# Patient Record
Sex: FEMALE | Race: WHITE | NOT HISPANIC OR LATINO | Employment: FULL TIME | URBAN - METROPOLITAN AREA
[De-identification: names, ages, dates, MRNs, and addresses within clinical notes are randomized per-mention and may not be internally consistent; named-entity substitution may affect disease eponyms.]

---

## 2023-09-19 ENCOUNTER — TELEPHONE (OUTPATIENT)
Dept: PSYCHIATRY | Facility: CLINIC | Age: 33
End: 2023-09-19

## 2023-09-19 NOTE — TELEPHONE ENCOUNTER
Behavioral Health Outpatient Intake Questions    Referred By: referral from [facility] counseling agency     Please advise interviewee that they need to answer all questions truthfully to allow for best care, and any misrepresentations of information may affect their ability to be seen at this clinic   => Was this discussed? Yes     If Minor Child (under age 25)    Who is/are the legal guardian(s) of the child? Is there a custody agreement? No     • If "YES"- Custody orders must be obtained prior to scheduling the first appointment  • In addition, Consent to Treatment must be signed by all legal guardians prior to scheduling the first appointment    • If "NO"- Consent to Treatment must be signed by all legal guardians prior to scheduling the first appointment      [address] History -     Presenting Problem (in patient's own words): mandated by Court- anxiety  Diagnosed with adjustment disorder    Are there any communication barriers for this patient? No                                               If yes, please describe barriers: ADHD  • If there is a unique situation, please refer to [address] for final determination. Are you taking any psychiatric medications? Yes   •   If "YES" -What are they Adderall   •   If "YES" -Who prescribes? [address]    Has the Patient previously received outpatient Talk Therapy or Medication Management from [facility]  No     •    If "YES"- When, Where and with Whom? n/a     •    If "NO" -Has Patient received these services elsewhere? •   If "YES" -When, Where, and with Whom? [facility] - May 2023-August 2023- had to leave due to conflict of interest (nothing serious)    Has the Patient abused alcohol or other substances in the last 6 months ? No  No concerns of substance abuse are reported.     •  If "YES" -What substance, How much, How often? n/a    •  If illegal substance: Refer to [facility] (for TOMY) or [facility]. •  If Alcohol in excess of 10 drinks per week:  Refer to [facility] (for TOMY) or [facility] History-     Is this treatment court ordered? Yes - awaiting court order  If "yes "send to :  • Talk Therapy : Send to [address] [place] [address] for final determination   • Med Management: Send to Dr Saleem Ng for final determination     Has the Patient been convicted of a felony? No   If "Yes" send to -When, What? n/a  • Talk Therapy : Send to [address] [place] [address] for final determination   • Med Management: Send to Dr Saleem Ng for final determination     ACCEPTED as a patient No  • If "Yes" Appointment Date: n/a    Referred Elsewhere? Yes  • If “Yes” - (Where? Ex: [facility], [address] [place] [address], etc.) Awaiting clinical approval for scheduling therapy (individual)      Name of Insurance [address] [place] [address]#[id number]  Insurance Phone #n/a  If ins is primary or secondary?primary  If patient is a minor, parents information such as Name, D. O.B of guarantor.

## 2023-09-19 NOTE — TELEPHONE ENCOUNTER
Approval rec'd and client has been scheduled with Michael Huang on 9/28 at 2pm in person. Court document and approval email uploaded into .

## 2023-09-28 ENCOUNTER — OFFICE VISIT (OUTPATIENT)
Dept: BEHAVIORAL/MENTAL HEALTH CLINIC | Facility: CLINIC | Age: 33
End: 2023-09-28
Payer: COMMERCIAL

## 2023-09-28 DIAGNOSIS — F43.21 ADJUSTMENT DISORDER WITH DEPRESSED MOOD: ICD-10-CM

## 2023-09-28 DIAGNOSIS — F41.1 GENERALIZED ANXIETY DISORDER: Primary | ICD-10-CM

## 2023-09-28 PROCEDURE — 90837 PSYTX W PT 60 MINUTES: CPT
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
## 2023-10-12 ENCOUNTER — SOCIAL WORK (OUTPATIENT)
Dept: BEHAVIORAL/MENTAL HEALTH CLINIC | Facility: CLINIC | Age: 33
End: 2023-10-12
Payer: COMMERCIAL

## 2023-10-12 DIAGNOSIS — F43.21 ADJUSTMENT DISORDER WITH DEPRESSED MOOD: ICD-10-CM

## 2023-10-12 DIAGNOSIS — F90.2 ATTENTION DEFICIT HYPERACTIVITY DISORDER, COMBINED TYPE: ICD-10-CM

## 2023-10-12 DIAGNOSIS — F41.1 GENERALIZED ANXIETY DISORDER: Primary | ICD-10-CM

## 2023-10-12 PROCEDURE — 90837 PSYTX W PT 60 MINUTES: CPT

## 2023-10-12 NOTE — BH CRISIS PLAN
Client Name: Lincoln Murdock       Client YOB: 1990  : 1990    Treatment Team (include name and contact information):     Psychotherapist: Michael Huang    Psychiatrist:    Release of information completed: no    "    Release of information completed: no    Other (Specify Role):     Release of information completed: no    Other (Specify Role):    Release of information completed: no    Healthcare Provider  Joshua Deras PA-C  [address] [facility] [address]      Type of Plan   * Child plans (children 15 yo and younger) must be completed and signed by the child's legal guardian   * Plans for all individuals 15 yo and above must be signed by the client.      Plan Type: adolescent/adult (14 and over) Initial      My Personal Strengths are (in the client's own words):  "***"  The stressors and triggers that may put me at risk are:  {AMB PSYCH/BH Triggers/Stressors:99884}    Coping skills I can use to keep myself calm and safe:  {AMB PSYCH/BH COPING SKILLS:15506}    Coping skills/supports I can use to maintain abstinence from substance use:   {Substance Copin}    The people that provide me with help and support: (Include name, contact, and how they can help)   Support person #1: ***    * Phone number: {Support Person Phone:[phone]}    * How can they help me? ***   Support person #2:***    * Phone number: {Support Person Phone:89667}    * How can they help me? ***     Support person #3: ***    * Phone number: {Support Person Phone:46156}    * How can they help me? ***    In the past, the following has helped me in times of crisis:    {AMB PSYCH/BH Things that help:92762}      If it is an emergency and you need immediate help, call     If there is a possibility of danger to yourself or others, call the following crisis hotline resources:     Adult Crisis Numbers  Suicide Prevention Hotline - Dial   [facility]: [address] [facility] [address] [facility] [address]: [address]: [address] [facility] [address]: [phone]  [address] [place] [address] Street: [phone]  [facility]: [address]: [address]: [phone] (daytime). [phone] (after hours, weekends, holidays)     Child/Adolescent Crisis Numbers   [facility] AND [facility]S [facility]: [address]: [phone]   Gwenevere Meyer: [phone]   [address] [place] [address] Street: [phone]    Please note: Some [facility] do not have a separate number for Child/Adolescent specific crisis. If your county is not listed under Child/Adolescent, please call the adult number for your county     National Talk to Text Line   All Mxsl - [phone]    In the event your feelings become unmanageable, and you cannot reach your support system, you will call 911 immediately or go to the nearest hospital emergency room.
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
## 2023-10-12 NOTE — BH TREATMENT PLAN
Outpatient Behavioral Health Psychotherapy Treatment Plan    Regina Carlson  1990     Date of Initial Psychotherapy Assessment: 9/28/23   Date of Current Treatment Plan: 10/12/23  Treatment Plan Target Date: 4/08/24  Treatment Plan Expiration Date: 4/8/24    Diagnosis:   No diagnosis found. Area(s) of Need: sense of self; anxiety    Long Term Goal 1 (in the client's own words): I want to identify goals for myself as a person    Stage of Change: Action    Target Date for completion: 4/8/24     Anticipated therapeutic modalities: talk therapy, MI, CBT, mindfulness      People identified to complete this goal: Kennis Blizzard, therapist      Objective 1: (identify the means of measuring success in meeting the objective):   Kennis Blizzard will particiapte in sessions to identify goals for self and methods to achieve the changes in herself that she would like to see happen      Objective 2: (identify the means of measuring success in meeting the objective):   Rigobertoblas Basiliaadela will practice the skills needed to facilitate the changes that she would like to see in herself in session and then in her everyday life      Long Term Goal 2 (in the client's own words):  I would like to address my anxiety regarding court issues and custody    Stage of Change: Action    Target Date for completion: 4/8/24     Anticipated therapeutic modalities: talk therapy, CBT, mindfulness     People identified to complete this goal: Kennis Blizzard, therapist      Objective 1: (identify the means of measuring success in meeting the objective):   Rigobertoblas Blizzard will participate in sessions using various modalities to identify triggers to anxiety in addition to the current custody situation      Objective 2: (identify the means of measuring success in meeting the objective):   Rigobertoblas Loomissusy will identify 2-3 coping strategies to use when anxious and will practice those strategies in session to increase her ability to independently implement them effectively outside of therapy Long Term Goal 3 (in the client's own words):     Stage of Change:     Target Date for completion:      Anticipated therapeutic modalities:      People identified to complete this goal:       Objective 1: (identify the means of measuring success in meeting the objective):       Objective 2: (identify the means of measuring success in meeting the objective): I am currently under the care of a [facility] psychiatric provider: no    My [facility] psychiatric provider is: N/A    I am currently taking psychiatric medications: Yes, as prescribed    I feel that I will be ready for discharge from mental health care when I reach the following (measurable goal/objective): when I am able to manage anxiety and when I have defined who I am as a person and how to achieve those goals    For children and adults who have a legal guardian:   Has there been any change to custody orders and/or guardianship status? NA. If yes, attach updated documentation. I have created my Crisis Plan and have been offered a copy of this plan. Crisis plan will be developed at the next session    [facility]: Diagnosis and Treatment Plan explained to [address] [place] [address] acknowledges an understanding of their diagnosis. Emily Melgoza agrees to this treatment plan.     I have been offered a copy of this Treatment Plan. yes

## 2023-10-26 ENCOUNTER — SOCIAL WORK (OUTPATIENT)
Dept: BEHAVIORAL/MENTAL HEALTH CLINIC | Facility: CLINIC | Age: 33
End: 2023-10-26
Payer: COMMERCIAL

## 2023-10-26 DIAGNOSIS — F41.1 GENERALIZED ANXIETY DISORDER: Primary | ICD-10-CM

## 2023-10-26 DIAGNOSIS — F43.21 ADJUSTMENT DISORDER WITH DEPRESSED MOOD: ICD-10-CM

## 2023-10-26 PROCEDURE — 90837 PSYTX W PT 60 MINUTES: CPT

## 2023-10-26 NOTE — BH CRISIS PLAN
Client Name: Anastacio Ayala       Client YOB: 1990  : 1990    Treatment Team (include name and contact information):     Psychotherapist: Jimbo Bejarano    Psychiatrist:    Release of information completed: no    "    Release of information completed: Other (Specify Role):     Release of information completed: no    Other (Specify Role):    Release of information completed: no    Healthcare Provider  Torey Pandey PA-C  [address] [place] [address]      Type of Plan   * Child plans (children 15 yo and younger) must be completed and signed by the child's legal guardian   * Plans for all individuals 15 yo and above must be signed by the client. Plan Type: adolescent/adult (15 and over) Initial      My Personal Strengths are (in the client's own words):  Organized, detail oriented, productive in daily tasks, strong minded, loves being near water, paddle boarding, easy to talk to    The stressors and triggers that may put me at risk are:  Grandfathers illness, alcohol use problems by mother, anniversary of mothers death, child custody issues, family problems, legal problems, and social difficulties(less friends currently)    Coping skills I can use to keep myself calm and safe: Take a shower, Listen to music, Physical activity, Severance/meditate, Journal, and Other (describe) reading, dog    Coping skills/supports I can use to maintain abstinence from substance use:   N/A    The people that provide me with help and support: (Include name, contact, and how they can help)   Support person #1: Grandfather    * Phone number: in cell phone    * How can they help me? Gives me a sense of comfort     Support person #2:    * Phone number:     * How can they help me? Support person #3:     * Phone number:       * How can they help me?      In the past, the following has helped me in times of crisis:    Being in a quiet space, Being with other people, Taking a walk or exercising, Breathing exercises (or other mindfulness-based activities), Praying or meditating, Listening to music, and Watching television or a movie      If it is an emergency and you need immediate help, call     If there is a possibility of danger to yourself or others, call the following crisis hotline resources:     Adult Crisis Numbers  Suicide Prevention Hotline - Dial   [facility]: [address] [facility]: [address]: [address] [facility] [address]: [phone]  [facility]: [phone]  [facility]: [address]: [address]: [phone] (daytime). [phone] (after hours, weekends, holidays)     Child/Adolescent Crisis Numbers   [facility]: [address]: [phone]   [facility]: [phone]   [facility]: [phone]    Please note: Some [facility] do not have a separate number for Child/Adolescent specific crisis. If your county is not listed under Child/Adolescent, please call the adult number for your county     National Talk to Text Line   All Ages - [phone]    In the event your feelings become unmanageable, and you cannot reach your support system, you will call 911 immediately or go to the nearest hospital emergency room.
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
## 2023-10-31 ENCOUNTER — TELEPHONE (OUTPATIENT)
Dept: PSYCHIATRY | Facility: CLINIC | Age: 33
End: 2023-10-31

## 2023-10-31 NOTE — TELEPHONE ENCOUNTER
Spoke with pt in regards to temporary transfer of care from current therapist who will be out on medical leave. Pt has been schedule with Kathy Doreen for 11/10 and 11/28.

## 2023-11-28 ENCOUNTER — SOCIAL WORK (OUTPATIENT)
Dept: BEHAVIORAL/MENTAL HEALTH CLINIC | Facility: CLINIC | Age: 33
End: 2023-11-28
Payer: COMMERCIAL

## 2023-11-28 DIAGNOSIS — F41.1 GENERALIZED ANXIETY DISORDER: Primary | ICD-10-CM

## 2023-11-28 PROCEDURE — 90834 PSYTX W PT 45 MINUTES: CPT

## 2023-12-03 NOTE — PSYCH
Behavioral Health Psychotherapy Progress Note    Psychotherapy Provided: Individual Psychotherapy     1. Generalized anxiety disorder            Goals addressed in session: Goal 1     DATA: Session began with a mental health check in and exchanging introductions. The clinician identified that she would be providing therapy for the month of December while her ongoing therapist was on leave. The client provided a brief personal history and the current challenges with coparenting with her ex-. The client discussed learning boundaries and practicing set boundaries with her son's father. During this session, this clinician used the following therapeutic modalities: Engagement Strategies, Client-centered Therapy, Cognitive Behavioral Therapy, Motivational Interviewing, and Supportive Psychotherapy    Substance Abuse was not addressed during this session. If the client is diagnosed with a co-occurring substance use disorder, please indicate any changes in the frequency or amount of use: None. Stage of change for addressing substance use diagnoses: No substance use/Not applicable    ASSESSMENT:  Carson Lacy presents with a Anxious and Depressed mood. her affect is Normal range and intensity and Tearful, which is congruent, with her mood and the content of the session. The client has made progress on their goals. Carson Lacy presents with a minimal risk of suicide, minimal risk of self-harm, and minimal risk of harm to others. For any risk assessment that surpasses a "low" rating, a safety plan must be developed. A safety plan was indicated: no  If yes, describe in detail N/A    PLAN: Carson Lacy will reflect on the content of session, practice self care, and positive self talk.  At the next session, the therapist will use Engagement Strategies, Client-centered Therapy, Cognitive Behavioral Therapy, Motivational Interviewing, and Supportive Psychotherapy to address emotional regulation, positive self talk, identified coping strategies, and boundary setting. Behavioral Health Treatment Plan and Discharge Planning: Bharat Valerogonzález is aware of and agrees to continue to work on their treatment plan. They have identified and are working toward their discharge goals.  no    This note was not shared with the patient due to this is a psychotherapy note    Visit start and stop times:    12/03/23  Start Time: 1000  Stop Time: 1045  Total Visit Time: 45 minutes

## 2023-12-12 ENCOUNTER — SOCIAL WORK (OUTPATIENT)
Dept: BEHAVIORAL/MENTAL HEALTH CLINIC | Facility: CLINIC | Age: 33
End: 2023-12-12
Payer: COMMERCIAL

## 2023-12-12 DIAGNOSIS — F41.1 GENERALIZED ANXIETY DISORDER: Primary | ICD-10-CM

## 2023-12-12 PROCEDURE — 90832 PSYTX W PT 30 MINUTES: CPT

## 2023-12-18 NOTE — PSYCH
"Behavioral Health Psychotherapy Progress Note    Psychotherapy Provided: Individual Psychotherapy     1. Generalized anxiety disorder            Goals addressed in session: Goal 1     DATA: Session began with a mental health check in with the client. This session will be the last session with this therapist before returning to her regular therapist Dennis. The client discussed in the session the challenges she was experiencing with her eldest son becoming more distant from her. The client discussed the emotions surrounding her children's father and his controlling nature. The client shared that the most recent custody order stated that she and the children's father had 50/50 custody, client would continue with therapy, client would provide attendance record in therapy to children's father, and refrain from introducing new partners to children. The client discussed that no future custody hearing dates have been set. The clinician advised the client to reach out to her  regarding custody arrangements.     During this session, this clinician used the following therapeutic modalities: Engagement Strategies, Client-centered Therapy, Cognitive Behavioral Therapy, Motivational Interviewing, and Supportive Psychotherapy    Substance Abuse was not addressed during this session. If the client is diagnosed with a co-occurring substance use disorder, please indicate any changes in the frequency or amount of use: None. Stage of change for addressing substance use diagnoses: No substance use/Not applicable    ASSESSMENT:  Maria Esther Moore presents with a Anxious mood.     her affect is Normal range and intensity, which is congruent, with her mood and the content of the session. The client has made progress on their goals.     Maria Esther Moore presents with a minimal risk of suicide, minimal risk of self-harm, and minimal risk of harm to others.    For any risk assessment that surpasses a \"low\" rating, a safety plan must " be developed.    A safety plan was indicated: no  If yes, describe in detail N/A    PLAN: Between sessions, Maria Esther Moore will reflect on the content of session, consider outreach to , and practice self care. At the next session, the therapist will use Engagement Strategies, Client-centered Therapy, Cognitive Behavioral Therapy, Motivational Interviewing, and Supportive Psychotherapy to address emotional dysregulation, anxiety, boundary setting, and self care.    Behavioral Health Treatment Plan and Discharge Planning: Maria Esther Moore is aware of and agrees to continue to work on their treatment plan. They have identified and are working toward their discharge goals. Yes    This note was not shared with the patient due to this is a psychotherapy note    Visit start and stop times:    12/18/23  Start Time: 1107  Stop Time: 1148  Total Visit Time: 41 minutes

## 2024-01-04 ENCOUNTER — SOCIAL WORK (OUTPATIENT)
Dept: BEHAVIORAL/MENTAL HEALTH CLINIC | Facility: CLINIC | Age: 34
End: 2024-01-04
Payer: COMMERCIAL

## 2024-01-04 DIAGNOSIS — F41.1 GENERALIZED ANXIETY DISORDER: Primary | ICD-10-CM

## 2024-01-04 DIAGNOSIS — F43.21 ADJUSTMENT DISORDER WITH DEPRESSED MOOD: ICD-10-CM

## 2024-01-04 PROCEDURE — 90837 PSYTX W PT 60 MINUTES: CPT

## 2024-01-04 NOTE — PSYCH
"Behavioral Health Psychotherapy Progress Note    Psychotherapy Provided: Individual Psychotherapy     No diagnosis found.    Goals addressed in session: Goal 1 and Goal 2     DATA:  Met with client individually.  Client shared that her GF passed, big loss.  Childrens father got engaged, 5 yo son said he was \"getting a new mommy and brothers and sisters\" and that she felt hurt by that statement.  Son said later that he didn't want a new mommy.  13 yo son spending more time at mothers house.  Client concerned about attention with fiance's children moving in with them.  Goal of increasing friends and support system.  Client is hosting weekly family dinners for sons, brother, uncles etc.  \"Meet up\" for increasing social group.   During this session, this clinician used the following therapeutic modalities: Client centered therapy, CBT, mindfuness and supportive therapy    Substance Abuse was not addressed during this session. If the client is diagnosed with a co-occurring substance use disorder, please indicate any changes in the frequency or amount of use: . Stage of change for addressing substance use diagnoses: No substance use/Not applicable    ASSESSMENT:  Maria Esther Moore presents with a Euthymic/ normal mood.     her affect is Normal range and intensity, which is congruent, with her mood and the content of the session. The client has made progress on their goals.  Maria Esther is better able to verbalize her feelings and set goals for herself     Maria Esther Moore presents with a minimal risk of suicide, minimal risk of self-harm, and minimal risk of harm to others.    For any risk assessment that surpasses a \"low\" rating, a safety plan must be developed.    A safety plan was indicated: no  If yes, describe in detail crisis plan on file    PLAN: Between sessions, Maria Esther Moore will continue to use coping skills when anxious and will explore ways to increase her social group. At the next session, the therapist " will use Client-centered Therapy, Cognitive Behavioral Therapy, Mindfulness-based Strategies, and Supportive Psychotherapy to address anxiety and self definition.    Behavioral Health Treatment Plan and Discharge Planning: Maria Esther Moore is aware of and agrees to continue to work on their treatment plan. They have identified and are working toward their discharge goals. yes    Visit start and stop times:    01/04/24

## 2024-01-04 NOTE — BH CRISIS PLAN
"Client Name: Maria Esther Moore       Client YOB: 1990  : 1990    Treatment Team (include name and contact information):     Psychotherapist: Dennis Cross     Psychiatrist:    Release of information completed:     \"    Release of information completed:     Other (Specify Role):     Release of information completed:     Other (Specify Role):    Release of information completed:     Healthcare Provider  Charmaine De La Cruz PA-C  [address] [place] [id number]      Type of Plan   * Child plans (children 12 yo and younger) must be completed and signed by the child's legal guardian   * Plans for all individuals 13 yo and above must be signed by the client.     Plan Type: adolescent/adult (14 and over) Initial      My Personal Strengths are (in the client's own words):    The stressors and triggers that may put me at risk are:  {AMB PSYCH/BH Triggers/Stressors:51808}    Coping skills I can use to keep myself calm and safe:  {AMB PSYCH/BH COPING SKILLS:22293}    Coping skills/supports I can use to maintain abstinence from substance use:   {Substance Copin}    The people that provide me with help and support: (Include name, contact, and how they can help)   Support person #1: ***    * Phone number: {Support Person Phone:12655}    * How can they help me? ***   Support person #2:***    * Phone number: {Support Person Phone:60303}    * How can they help me? ***     Support person #3: ***    * Phone number: {Support Person Phone:05694}    * How can they help me? ***    In the past, the following has helped me in times of crisis:    {AMB PSYCH/BH Things that help:19792}      If it is an emergency and you need immediate help, call     If there is a possibility of danger to yourself or others, call the following crisis hotline resources:     Adult Crisis Numbers  Suicide Prevention Hotline - Dial   [facility]: [phone]  [facility]: [phone]  [facility]: " [phone]  [facility]: [phone]  [facility]: [phone]  [facility]: [phone]  [facility]: [phone]  [facility]: [phone] (daytime).       [phone] (after hours, weekends, holidays)     Child/Adolescent Crisis Numbers   [facility]: [phone]   [facility]: [phone]   [place]: [phone]   [facility]: [phone]    Please note: Some [facility] do not have a separate number for Child/Adolescent specific crisis. If your county is not listed under Child/Adolescent, please call the adult number for your county     National Talk to Text Line   All Ages - [phone]    In the event your feelings become unmanageable, and you cannot reach your support system, you will call 911 immediately or go to the nearest hospital emergency room.

## 2024-01-18 ENCOUNTER — SOCIAL WORK (OUTPATIENT)
Dept: BEHAVIORAL/MENTAL HEALTH CLINIC | Facility: CLINIC | Age: 34
End: 2024-01-18
Payer: COMMERCIAL

## 2024-01-18 DIAGNOSIS — F90.2 ATTENTION DEFICIT HYPERACTIVITY DISORDER, COMBINED TYPE: ICD-10-CM

## 2024-01-18 DIAGNOSIS — F41.1 GENERALIZED ANXIETY DISORDER: Primary | ICD-10-CM

## 2024-01-18 DIAGNOSIS — F43.21 ADJUSTMENT DISORDER WITH DEPRESSED MOOD: ICD-10-CM

## 2024-01-18 PROCEDURE — 90837 PSYTX W PT 60 MINUTES: CPT

## 2024-01-18 NOTE — PSYCH
"Behavioral Health Psychotherapy Progress Note    Psychotherapy Provided: Individual Psychotherapy     No diagnosis found.    Goals addressed in session: Goal 1 and Goal 2     DATA:   Met with client individually.  Discussed recent events with sons and sons father and inconsistency between households.  Client is setting appropriate boundaries with older son but father does not, son is rebelling toward client.  Discussed possibility of co-parent counseling through the court if necessary as father is putting client in the \"bad \" role and client is concerned that son will get into things that are harmful, given her family's history of D&A issues.  Client is attempting to increase her social [place] by interacting with other parents at sporting events etc.  Discussed possibility of \"Meet up\" groups for single parents etc.  Client has begun to have family dinners at her home weekly which include her sons, uncles and her brother and his wife.  Client has begun interactions more with her brother which appears to be a support for her.    During this session, this clinician used the following therapeutic modalities: Client-centered Therapy, Cognitive Behavioral Therapy, and Supportive Psychotherapy    Substance Abuse was not addressed during this session. If the client is diagnosed with a co-occurring substance use disorder, please indicate any changes in the frequency or amount of use: . Stage of change for addressing substance use diagnoses: No substance use/Not applicable    ASSESSMENT:  Maria Esther Moore presents with a Euthymic/ normal mood.     her affect is Normal range and intensity, which is congruent, with her mood and the content of the session. The client has made progress on their goals.     Maria Esther Moore presents with a minimal risk of suicide, minimal risk of self-harm, and minimal risk of harm to others.    For any risk assessment that surpasses a \"low\" rating, a safety plan must be developed.    A " safety plan was indicated: no  If yes, describe in detail crisis plan on file    PLAN: Between sessions, Maria Esther Moore will continue to set and maintain appropriate expectations for her children and will attmept to communicate with sons father.  Client will continue to expand her social/support [place] to increase her sense of self and self image. At the next session, the therapist will use Client-centered Therapy, Cognitive Behavioral Therapy, and Supportive Psychotherapy to address anxiety and increasing her sense of self.    Behavioral Health Treatment Plan and Discharge Planning: Maria Esther Moore is aware of and agrees to continue to work on their treatment plan. They have identified and are working toward their discharge goals. yes    Visit start and stop times:    01/18/24  Start Time: 1400  Stop Time: 1457  Total Visit Time: 57 minutes

## 2024-02-01 ENCOUNTER — SOCIAL WORK (OUTPATIENT)
Dept: BEHAVIORAL/MENTAL HEALTH CLINIC | Facility: CLINIC | Age: 34
End: 2024-02-01
Payer: COMMERCIAL

## 2024-02-01 DIAGNOSIS — F43.21 ADJUSTMENT DISORDER WITH DEPRESSED MOOD: ICD-10-CM

## 2024-02-01 DIAGNOSIS — F41.1 GENERALIZED ANXIETY DISORDER: Primary | ICD-10-CM

## 2024-02-01 DIAGNOSIS — F90.2 ATTENTION DEFICIT HYPERACTIVITY DISORDER, COMBINED TYPE: ICD-10-CM

## 2024-02-01 PROCEDURE — 90837 PSYTX W PT 60 MINUTES: CPT

## 2024-02-05 NOTE — PSYCH
"Behavioral Health Psychotherapy Progress Note    Psychotherapy Provided: Individual Psychotherapy     No diagnosis found.    Goals addressed in session: Goal 1 and Goal 2     DATA: Met with client individually.  Discussed clients relationship with father of her 2 children and current custody situation.  Client stated feeling \"positive about it for the first time since this started\".  Client stated that her older son is more engaged with her and that the conflict with their father is less, mostly due to his lack of engagement.  Father got engaged after dating a woman for 6 months and sons told her that her kids are calling Anthony \"Dad\".   Discussed ways to talk to Anthony about it to see if that is even a thought.  Also processed why her children who do not have a relationship with their fathers would want to call Anthony \"dad\" but her children have a relationship with their mom, so they may not want to.  She stated being concerned that her sons will be pressured to call the fiance \"mom\".  Client discussed continued efforts to connect with extended family and other adults in an effort to define herself now.    During this session, this clinician used the following therapeutic modalities: Client-centered Therapy, Cognitive Behavioral Therapy, and Supportive Psychotherapy    Substance Abuse was not addressed during this session. If the client is diagnosed with a co-occurring substance use disorder, please indicate any changes in the frequency or amount of use: . Stage of change for addressing substance use diagnoses: No substance use/Not applicable    ASSESSMENT:  Maria Esther Moore presents with a Euthymic/ normal mood.     her affect is Normal range and intensity, which is congruent, with her mood and the content of the session. The client has made progress on their goals.     Maria Esther Moore presents with a minimal risk of suicide, minimal risk of self-harm, and minimal risk of harm to others.    For any risk assessment " "that surpasses a \"low\" rating, a safety plan must be developed.    A safety plan was indicated: no  If yes, describe in detail crisis plan on file    PLAN: Between sessions, Maria Esther Moore will continue to attempt to coparent with Anthony but will set boundaries for her home when boys are there.  She will continue to outreach to extended family and other adults to define herself more clearly. At the next session, the therapist will use Client-centered Therapy, Cognitive Behavioral Therapy, Mindfulness-based Strategies, and Supportive Psychotherapy to address anxiety.    Behavioral Health Treatment Plan and Discharge Planning: Maria Esther Moore is aware of and agrees to continue to work on their treatment plan. They have identified and are working toward their discharge goals. yes    Visit start and stop times:    02/05/24  Start Time: 1400  Stop Time: 1455  Total Visit Time: 55 minutes  "

## 2024-02-29 ENCOUNTER — SOCIAL WORK (OUTPATIENT)
Dept: BEHAVIORAL/MENTAL HEALTH CLINIC | Facility: CLINIC | Age: 34
End: 2024-02-29
Payer: COMMERCIAL

## 2024-02-29 DIAGNOSIS — F43.21 ADJUSTMENT DISORDER WITH DEPRESSED MOOD: ICD-10-CM

## 2024-02-29 DIAGNOSIS — F41.1 GENERALIZED ANXIETY DISORDER: Primary | ICD-10-CM

## 2024-02-29 DIAGNOSIS — F90.2 ATTENTION DEFICIT HYPERACTIVITY DISORDER, COMBINED TYPE: ICD-10-CM

## 2024-02-29 PROCEDURE — 90837 PSYTX W PT 60 MINUTES: CPT

## 2024-02-29 NOTE — PSYCH
"Behavioral Health Psychotherapy Progress Note    Psychotherapy Provided: Individual Psychotherapy     No diagnosis found.    Goals addressed in session: Goal 1 and Goal 2   Data:   Met with client individually.  Client discussed feeling that Nestor is not wanting to be with her or if he is just \"being an adolescent\".  Discussed typical developmental stage of individuation and what Nestor might be feeling regarding all of the changes in his family life in the past year. Client reported that Anthony is taking Nestor out of therapy and expressed concern because she feels he could benefit from it.  Client stated that she did talk to her  about it as it is court ordered but doesn't want to \"make waves\" and \"appear to be the crazy one\".   Discussed different parenting styles between her and Anthony and what she can and cannot control and setting appropriate parental limits for herself.  Client reported attending several AA meetings over the past week.  She stated not feeling that she has a \"drinking problem\" but admits that she does get episodes of depression after drinking.  She reports that she is also meeting sober friends with similar kids issues which is a positive thing for her.   During this session, this clinician used the following therapeutic modalities: Client-centered Therapy, Cognitive Behavioral Therapy, Supportive Psychotherapy, and psychoeducation    Substance Abuse was not addressed during this session. If the client is diagnosed with a co-occurring substance use disorder, please indicate any changes in the frequency or amount of use: client drinks occasionally but sees a pattern of depression after drinking. Stage of change for addressing substance use diagnoses: Pre-contemplation    ASSESSMENT:  Maria Esther Moore presents with a Euthymic/ normal mood.     her affect is Normal range and intensity, which is congruent, with her mood and the content of the session. The client has made progress on their " "goals.     Maria Esther Moore presents with a minimal risk of suicide, minimal risk of self-harm, and minimal risk of harm to others.    For any risk assessment that surpasses a \"low\" rating, a safety plan must be developed.    A safety plan was indicated: no  If yes, describe in detail crisis plan on file    PLAN: Between sessions, Maria Esther Moore will continue to set appropriate parental boundaries in her home and will attempt to talk to Nestor about his feelings of being split between households (hers, father and step mother parents). At the next session, the therapist will use Client-centered Therapy, Cognitive Behavioral Therapy, Mindfulness-based Strategies, and Supportive Psychotherapy to address anxiety and self definition.    Behavioral Health Treatment Plan and Discharge Planning: Maria Esther Moore is aware of and agrees to continue to work on their treatment plan. They have identified and are working toward their discharge goals. yes    Visit start and stop times:    02/29/24  Start Time: 1400  Stop Time: 1454  Total Visit Time: 54 minutes  " Quality 130: Documentation Of Current Medications In The Medical Record: Current Medications Documented Quality 431: Preventive Care And Screening: Unhealthy Alcohol Use - Screening: Patient not identified as an unhealthy alcohol user when screened for unhealthy alcohol use using a systematic screening method Quality 110: Preventive Care And Screening: Influenza Immunization: Influenza Immunization Administered during Influenza season Detail Level: Detailed Quality 226: Preventive Care And Screening: Tobacco Use: Screening And Cessation Intervention: Patient screened for tobacco use and is an ex/non-smoker

## 2024-03-14 ENCOUNTER — TELEMEDICINE (OUTPATIENT)
Dept: BEHAVIORAL/MENTAL HEALTH CLINIC | Facility: CLINIC | Age: 34
End: 2024-03-14
Payer: COMMERCIAL

## 2024-03-14 DIAGNOSIS — F43.21 ADJUSTMENT DISORDER WITH DEPRESSED MOOD: ICD-10-CM

## 2024-03-14 DIAGNOSIS — F90.2 ATTENTION DEFICIT HYPERACTIVITY DISORDER, COMBINED TYPE: ICD-10-CM

## 2024-03-14 DIAGNOSIS — F41.1 GENERALIZED ANXIETY DISORDER: Primary | ICD-10-CM

## 2024-03-14 PROCEDURE — 90834 PSYTX W PT 45 MINUTES: CPT

## 2024-03-15 NOTE — PSYCH
"Behavioral Health Psychotherapy Progress Note    Psychotherapy Provided: Individual Psychotherapy     No diagnosis found.    Goals addressed in session: Goal 1 and Goal 2     DATA:   Met with client via Epic Embedded.  Client discussed improvement in communication with childrens father in that he has been responding to her text messages over the past 2 weeks and was open to negotiating time being missed with her boys while he takes them to [place] for a week and for increased contact with them while in [place].  Client also reported that older son Nestor has been voluntarily spending more time with her.  He has been coming to the house more and asking to do activities with her and his brother more frequently.  Client stated feeling like there \"is a light at the end of this tunnel finally\".  Client has continued to attend AA meetings and to connect with other women who are also dealing with similar situations.  She made a decision for her peace of mind to change the family dinner night to Sunday to lessen the stress on her and the change was well received by family members.   During this session, this clinician used the following therapeutic modalities: Client-centered Therapy and Supportive Psychotherapy    Substance Abuse was not addressed during this session. If the client is diagnosed with a co-occurring substance use disorder, please indicate any changes in the frequency or amount of use: . Stage of change for addressing substance use diagnoses: No substance use/Not applicable    ASSESSMENT:  Maria Esther Moore presents with a Euthymic/ normal mood.     her affect is Normal range and intensity, which is congruent, with her mood and the content of the session. The client has made progress on their goals.     Maria Esther Moore presents with a minimal risk of suicide, minimal risk of self-harm, and minimal risk of harm to others.    For any risk assessment that surpasses a \"low\" rating, a safety plan must be developed.    A " safety plan was indicated: no  If yes, describe in detail crisis plan on file    PLAN: Between sessions, Maria Esther Moore will continue to set and maintain boudnaries with sons and sons' father. At the next session, the therapist will use Client-centered Therapy and Supportive Psychotherapy to address anxiety and finding her self.    Behavioral Health Treatment Plan and Discharge Planning: Maria Esther Moore is aware of and agrees to continue to work on their treatment plan. They have identified and are working toward their discharge goals. yes    Visit start and stop times:    03/15/24  Start Time: 1400  Stop Time: 1448  Total Visit Time: 48 minutes  Verification of patient location:    Patient is located at Home in the following state in which I provide services: [place]        Encounter provider Dennis Cross    Provider located at [facility]  [address] [address]  [phone]        The patient was identified by name and date of birth. Maria Esther Moore was informed that this is a telemedicine visit and that the visit is being conducted throughthe Epic Embedded platform. She agrees to proceed..  My office door was closed. No one else was in the room.  She acknowledged consent and understanding of privacy and security of the video platform. The patient has agreed to participate and understands they can discontinue the visit at any time.    Patient is aware this is a billable service.

## 2024-03-26 ENCOUNTER — TELEPHONE (OUTPATIENT)
Dept: PSYCHIATRY | Facility: CLINIC | Age: 34
End: 2024-03-26

## 2024-03-26 NOTE — TELEPHONE ENCOUNTER
Left voicemail informing patient and/or parent/guardian of the Psych Encounter form needing to be signed as a requirement from the insurance company for billing purposes. Patient can access form via [employer] and sign electronically.     Please make patient aware this form must be signed for each visit as a requirement to continue future visits with provider.

## 2024-04-01 ENCOUNTER — TELEPHONE (OUTPATIENT)
Dept: PSYCHIATRY | Facility: CLINIC | Age: 34
End: 2024-04-01

## 2024-04-11 ENCOUNTER — SOCIAL WORK (OUTPATIENT)
Dept: BEHAVIORAL/MENTAL HEALTH CLINIC | Facility: CLINIC | Age: 34
End: 2024-04-11
Payer: COMMERCIAL

## 2024-04-11 DIAGNOSIS — F43.21 ADJUSTMENT DISORDER WITH DEPRESSED MOOD: ICD-10-CM

## 2024-04-11 DIAGNOSIS — F41.1 GENERALIZED ANXIETY DISORDER: Primary | ICD-10-CM

## 2024-04-11 DIAGNOSIS — F90.2 ATTENTION DEFICIT HYPERACTIVITY DISORDER, COMBINED TYPE: ICD-10-CM

## 2024-04-11 PROCEDURE — 90837 PSYTX W PT 60 MINUTES: CPT

## 2024-04-11 NOTE — PSYCH
Behavioral Health Psychotherapy Progress Note    Psychotherapy Provided: Individual Psychotherapy     No diagnosis found.    Goals addressed in session: Goal 1 and Goal 2     DATA:   Met with client individually.  Client discussed issues with boys father and her concerns about his level of supervision of the boys, particularly the oldest who is 12. Client referenced finding out that he was planning on allowing Nestor to stay home alone for the weekend while he took Lobito hunting as well as having Nestor have access to a pool during the summer without adult supervision.  Discussed options of trying to communicate with Anthony or going to court.  Client stated that she doesn't want to put the boys in the middle but feels that is happening anyway due to Anthony making comments to the boys.  Discussed possible reasons for Anthony's refusal to have face to face communication might be that he thinks she is going to go to court for child support and she stated that she is not at this point.  Discussed ways to communicate that to Anthony to possibly get his barriers down.  Court ordered co-parenting therapy is another possibility that client would welcome but she does not feel that Anthony would be open to. Discussed the need for communication with boys so she isnt made to be the bad sarthak.  Discussed safety issues with Nestor being alone and at a pool could mean [employer] involvement.    During this session, this clinician used the following therapeutic modalities: Client-centered Therapy, Cognitive Behavioral Therapy, and Supportive Psychotherapy    Substance Abuse was not addressed during this session. If the client is diagnosed with a co-occurring substance use disorder, please indicate any changes in the frequency or amount of use: . Stage of change for addressing substance use diagnoses: No substance use/Not applicable    ASSESSMENT:  Maria Esther Moore presents with a Euthymic/ normal mood.     her affect is Normal range and intensity,  "which is congruent, with her mood and the content of the session. The client has made progress on their goals.     Maria Esther Moore presents with a minimal risk of suicide, minimal risk of self-harm, and minimal risk of harm to others.    For any risk assessment that surpasses a \"low\" rating, a safety plan must be developed.    A safety plan was indicated: no  If yes, describe in detail crisis plan on file    PLAN: Between sessions, Maria Esther Moore will attempt to increase communication with Anthony and set boundaries with him regarding concerns that she feels are safety issues for her children. At the next session, the therapist will use Client-centered Therapy, Cognitive Behavioral Therapy, and Supportive Psychotherapy to address anxiety.    Behavioral Health Treatment Plan and Discharge Planning: Maria Esther Moore is aware of and agrees to continue to work on their treatment plan. They have identified and are working toward their discharge goals. yes    Visit start and stop times:    04/11/24  Start Time: 1400  Stop Time: 1455  Total Visit Time: 55 minutes  "

## 2024-04-25 ENCOUNTER — SOCIAL WORK (OUTPATIENT)
Dept: BEHAVIORAL/MENTAL HEALTH CLINIC | Facility: CLINIC | Age: 34
End: 2024-04-25
Payer: COMMERCIAL

## 2024-04-25 DIAGNOSIS — F43.21 ADJUSTMENT DISORDER WITH DEPRESSED MOOD: ICD-10-CM

## 2024-04-25 DIAGNOSIS — F41.1 GENERALIZED ANXIETY DISORDER: Primary | ICD-10-CM

## 2024-04-25 DIAGNOSIS — F90.2 ATTENTION DEFICIT HYPERACTIVITY DISORDER, COMBINED TYPE: ICD-10-CM

## 2024-04-25 PROCEDURE — 90837 PSYTX W PT 60 MINUTES: CPT

## 2024-04-25 NOTE — BH TREATMENT PLAN
Outpatient Behavioral Health Psychotherapy Treatment Plan    Maria Esther Moore  1990     Date of Initial Psychotherapy Assessment: 9/28/23   Date of Current Treatment Plan: 04/25/24  Treatment Plan Target Date: 10/20/24  Treatment Plan Expiration Date: 10/20/24    Diagnosis:   No diagnosis found.    Area(s) of Need: sense of self; anxiety     Long Term Goal 1 (in the client's own words): I want to identify goals for myself as a person     Stage of Change: Action     Target Date for completion: 10/20/24             Anticipated therapeutic modalities: talk therapy, MI, CBT, mindfulness              People identified to complete this goal: daryl Mayo                    Objective 1: (identify the means of measuring success in meeting the objective):   Maria Esther will particiapte in sessions to identify goals for self and methods to achieve the changes in herself that she would like to see happen                    Objective 2: (identify the means of measuring success in meeting the objective):   Maria Esther will practice the skills needed to facilitate the changes that she would like to see in herself in session and then in her everyday life      Long Term Goal 2 (in the client's own words): I would like to address my anxiety regarding court issues and custody     Stage of Change: Action     Target Date for completion: 10/20/24             Anticipated therapeutic modalities: talk therapy, CBT, mindfulness             People identified to complete this goal: daryl Mayo                    Objective 1: (identify the means of measuring success in meeting the objective):   Maria Esther will participate in sessions using various modalities to identify triggers to anxiety in addition to the current custody situation                    Objective 2: (identify the means of measuring success in meeting the objective):   Maria Esther will identify 2-3 coping strategies to use when anxious and will practice those strategies in  session to increase her ability to independently implement them effectively outside of therapy       I am currently under the care of a [facility] psychiatric provider: no    My [facility] psychiatric provider is:     I am currently taking psychiatric medications:  N/A    I feel that I will be ready for discharge from mental health care when I reach the following (measurable goal/objective): I am able to use coping skills when anxious and when I feel that I have redefined myself.    For children and adults who have a legal guardian:   Has there been any change to custody orders and/or guardianship status? NA. If yes, attach updated documentation.    I have created my Crisis Plan and have been offered a copy of this plan    Behavioral Health Treatment Plan [facility]: Diagnosis and Treatment Plan explained to Maria Esther Moore acknowledges an understanding of their diagnosis. Maria Esther Moore agrees to this treatment plan.    I have been offered a copy of this Treatment Plan. yes

## 2024-04-25 NOTE — PSYCH
"Behavioral Health Psychotherapy Progress Note    Psychotherapy Provided: Individual Psychotherapy     No diagnosis found.    Goals addressed in session: Goal 1 and Goal 2     DATA:   Met with client individually.  Discussed concerns about Anthony's parenting and feeling like only options is court because he refuses to communicate with client.  Client expressed concerns that Nicole friends may be a bad influence.  She has concerns about them stealing.  Client expressed concerns regarding the level of freedom that Anthony gives Nestor at age 12 and potential danger. Nestor is seeking her out more and more.  Client reported that she is happy but concerned about the lack of parenting by Anthony-leaving kids with Sera parent/brother or alone for periods of time.  Client stated that she has worked a lot on herself and that she feels worthy of getting more custody.  Client stated that she made a priority of spending time with a friend recently to work on her goal of self identification and increasing friendships.  Felt \"broken beyond broken\" a year ago, now feeling stronger.  Helio is out of intermediate-told boys if they see him, not to be afraid or worried about upsetting her but to tell her so she can call the police-no relationship again with him ever. TP updated  During this session, this clinician used the following therapeutic modalities: Client-centered Therapy, Cognitive Behavioral Therapy, and Supportive Psychotherapy    Substance Abuse was not addressed during this session. If the client is diagnosed with a co-occurring substance use disorder, please indicate any changes in the frequency or amount of use: . Stage of change for addressing substance use diagnoses: No substance use/Not applicable    ASSESSMENT:  Maria Esther Moore presents with a Euthymic/ normal mood.     her affect is Normal range and intensity, which is congruent, with her mood and the content of the session. The client has made progress on their goals.     " "Maria Esther Moore presents with a minimal risk of suicide, minimal risk of self-harm, and minimal risk of harm to others.    For any risk assessment that surpasses a \"low\" rating, a safety plan must be developed.    A safety plan was indicated: no  If yes, describe in detail crisis plan on file    PLAN: Between sessions, Maria Esther Moore will continue to work on self improvement and will consider options that she feels are needed to secure childrens safety. At the next session, the therapist will use Client-centered Therapy and Supportive Psychotherapy to address anxiety.    Behavioral Health Treatment Plan and Discharge Planning: Maria Esther Moore is aware of and agrees to continue to work on their treatment plan. They have identified and are working toward their discharge goals. yes    Visit start and stop times:    04/25/24  1402  8815  53 min     "

## 2024-05-23 ENCOUNTER — SOCIAL WORK (OUTPATIENT)
Dept: BEHAVIORAL/MENTAL HEALTH CLINIC | Facility: CLINIC | Age: 34
End: 2024-05-23
Payer: COMMERCIAL

## 2024-05-23 DIAGNOSIS — F43.21 ADJUSTMENT DISORDER WITH DEPRESSED MOOD: ICD-10-CM

## 2024-05-23 DIAGNOSIS — F90.2 ATTENTION DEFICIT HYPERACTIVITY DISORDER, COMBINED TYPE: ICD-10-CM

## 2024-05-23 DIAGNOSIS — F41.1 GENERALIZED ANXIETY DISORDER: Primary | ICD-10-CM

## 2024-05-23 PROCEDURE — 90834 PSYTX W PT 45 MINUTES: CPT
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
## 2024-06-06 ENCOUNTER — SOCIAL WORK (OUTPATIENT)
Dept: BEHAVIORAL/MENTAL HEALTH CLINIC | Facility: CLINIC | Age: 34
End: 2024-06-06
Payer: COMMERCIAL

## 2024-06-06 DIAGNOSIS — F43.21 ADJUSTMENT DISORDER WITH DEPRESSED MOOD: ICD-10-CM

## 2024-06-06 DIAGNOSIS — F41.1 GENERALIZED ANXIETY DISORDER: Primary | ICD-10-CM

## 2024-06-06 DIAGNOSIS — F90.2 ATTENTION DEFICIT HYPERACTIVITY DISORDER, COMBINED TYPE: ICD-10-CM

## 2024-06-06 PROCEDURE — 90837 PSYTX W PT 60 MINUTES: CPT
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
## 2024-06-20 ENCOUNTER — TELEMEDICINE (OUTPATIENT)
Dept: BEHAVIORAL/MENTAL HEALTH CLINIC | Facility: CLINIC | Age: 34
End: 2024-06-20
Payer: COMMERCIAL

## 2024-06-20 DIAGNOSIS — F41.1 GENERALIZED ANXIETY DISORDER: Primary | ICD-10-CM

## 2024-06-20 DIAGNOSIS — F90.2 ATTENTION DEFICIT HYPERACTIVITY DISORDER, COMBINED TYPE: ICD-10-CM

## 2024-06-20 DIAGNOSIS — F43.21 ADJUSTMENT DISORDER WITH DEPRESSED MOOD: ICD-10-CM

## 2024-06-20 PROCEDURE — 90837 PSYTX W PT 60 MINUTES: CPT

## 2024-06-20 NOTE — PSYCH
"Behavioral Health Psychotherapy Progress Note    Psychotherapy Provided: Individual Psychotherapy     No diagnosis found.    Goals addressed in session: Goal 1     DATA:     Met with client individually via epic embedded.  Client discussed custody issues and accusations by [place].  Open questions were used to explore clients beliefs as to why son may have made such an accusation to his therapist as son admitted doing so.  Could this be coming from [place]?  Therapist validated client attempting to set appropriate parental limits and noted that Nestor still wants to be with her which seems to indicate that this was not coming from him.  Client expressed concern that kids in the neighborhood thought they saw client with Helio (it was not him) and told Nestor.  Nestor then refused to answer phone and turned off his location.  Discussed ways for client to have discussion about her understanding his trust issues but that is not ok to turn off location and not respond to calls and if it happens again=consequences. Court on 6/27.    During this session, this clinician used the following therapeutic modalities: Client-centered Therapy and Supportive Psychotherapy    Substance Abuse was not addressed during this session. If the client is diagnosed with a co-occurring substance use disorder, please indicate any changes in the frequency or amount of use: . Stage of change for addressing substance use diagnoses: No substance use/Not applicable    ASSESSMENT:  Maria Esther Moore presents with a Euthymic/ normal mood.     her affect is Normal range and intensity, which is congruent, with her mood and the content of the session. The client has made progress on their goals.     Maria Esther Moore presents with a minimal risk of suicide, minimal risk of self-harm, and minimal risk of harm to others.    For any risk assessment that surpasses a \"low\" rating, a safety plan must be developed.    A safety plan was indicated: no  If yes, describe " in detail crisis plan on file    PLAN: Between sessions, Maria Esther Moore will continue to set and maintain her parental boundaries and expectations as she feels appropriate and will continue to have open communication with sons about what is going on with custody. At the next session, the therapist will use Client-centered Therapy, Cognitive Behavioral Therapy, and Supportive Psychotherapy to address anxiety and coparenting challenges.    Behavioral Health Treatment Plan and Discharge Planning: Maria Esther Moore is aware of and agrees to continue to work on their treatment plan. They have identified and are working toward their discharge goals. yes      Virtual Regular Visit    Verification of patient location:    Patient is located at Home in the following state in which I provide services: [place]           Encounter provider Dennis Cross        The patient was identified by name and date of birth. Maria Esther Moore was informed that this is a telemedicine visit and that the visit is being conducted throughthe Epic Embedded platform. She agrees to proceed..  My office door was closed. No one else was in the room.  She acknowledged consent and understanding of privacy and security of the video platform. The patient has agreed to participate and understands they can discontinue the visit at any time.    Patient is aware this is a billable service.       Visit Time    Visit Start Time: 1401  Visit Stop Time: 1456  Total Visit Duration:  55 minutes

## 2024-07-18 ENCOUNTER — TELEMEDICINE (OUTPATIENT)
Dept: BEHAVIORAL/MENTAL HEALTH CLINIC | Facility: CLINIC | Age: 34
End: 2024-07-18
Payer: COMMERCIAL

## 2024-07-18 DIAGNOSIS — F41.1 GENERALIZED ANXIETY DISORDER: Primary | ICD-10-CM

## 2024-07-18 DIAGNOSIS — F90.2 ATTENTION DEFICIT HYPERACTIVITY DISORDER, COMBINED TYPE: ICD-10-CM

## 2024-07-18 DIAGNOSIS — F43.21 ADJUSTMENT DISORDER WITH DEPRESSED MOOD: ICD-10-CM

## 2024-07-18 PROCEDURE — 90837 PSYTX W PT 60 MINUTES: CPT
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
## 2024-07-23 ENCOUNTER — TELEPHONE (OUTPATIENT)
Dept: PSYCHIATRY | Facility: CLINIC | Age: 34
End: 2024-07-23

## 2024-08-15 ENCOUNTER — TELEMEDICINE (OUTPATIENT)
Dept: BEHAVIORAL/MENTAL HEALTH CLINIC | Facility: CLINIC | Age: 34
End: 2024-08-15
Payer: COMMERCIAL

## 2024-08-15 DIAGNOSIS — F90.2 ATTENTION DEFICIT HYPERACTIVITY DISORDER, COMBINED TYPE: ICD-10-CM

## 2024-08-15 DIAGNOSIS — F41.1 GENERALIZED ANXIETY DISORDER: Primary | ICD-10-CM

## 2024-08-15 DIAGNOSIS — F43.21 ADJUSTMENT DISORDER WITH DEPRESSED MOOD: ICD-10-CM

## 2024-08-15 PROCEDURE — 90837 PSYTX W PT 60 MINUTES: CPT

## 2024-08-20 NOTE — PSYCH
"Behavioral Health Psychotherapy Progress Note    Psychotherapy Provided: Individual Psychotherapy     No diagnosis found.    Goals addressed in session: Goal 1 and Goal 2     DATA:   court date pushed back until after start of school.  Client expressed frustration at the court process and boys father \"playing dirty\" by talking negatively about her to the boys and sending in a statement that makes her look unstable and an unfit mother. CBT used to explore the reality of his statements and of the evidence that she has accumulated showing that he is making poor choices for the boys.  Client stated feeling \"brought down\" by the whole last year and a half, therapist validated clients strength in continuing to be an involved mother and fighting for what she feels is best for her children.  Explored possible outcomes of changing [employer] school if she gets 50/50 custody after the school year starts. Client stated being proud of the relationships that she had been able to have with her GF and brother over the years.   During this session, this clinician used the following therapeutic modalities: Client-centered Therapy, Cognitive Behavioral Therapy, and Supportive Psychotherapy    Substance Abuse was not addressed during this session. If the client is diagnosed with a co-occurring substance use disorder, please indicate any changes in the frequency or amount of use: . Stage of change for addressing substance use diagnoses: No substance use/Not applicable    ASSESSMENT:  Maria Esther Moore presents with a Anxious mood.     her affect is Normal range and intensity, which is congruent, with her mood and the content of the session. The client has made progress on their goals.     Maria Esther Moore presents with a minimal risk of suicide, minimal risk of self-harm, and minimal risk of harm to others.    For any risk assessment that surpasses a \"low\" rating, a safety plan must be developed.    A safety plan was indicated: no  If " yes, describe in detail crisis plan on file    PLAN: Between sessions, Maria Esther Moore will continue to express feelings to boys about why she is continuing the custody fight as well as continue to advocate for her children. At the next session, the therapist will use Client-centered Therapy, Cognitive Behavioral Therapy, and Supportive Psychotherapy to address anxiety and her sense of self worth.    Behavioral Health Treatment Plan and Discharge Planning: Maria Esther Moore is aware of and agrees to continue to work on their treatment plan. They have identified and are working toward their discharge goals. yes    Visit start and stop times:    08/20/24     Virtual Regular Visit    Verification of patient location:    Patient is located at Home in the following state in which I provide services: [place]           Encounter provider Dennis Cross        The patient was identified by name and date of birth. Maria Esther Moore was informed that this is a telemedicine visit and that the visit is being conducted throughthe Epic Embedded platform. She agrees to proceed..  My office door was closed. No one else was in the room.  She acknowledged consent and understanding of privacy and security of the video platform. The patient has agreed to participate and understands they can discontinue the visit at any time.    Patient is aware this is a billable service.       Visit Time    Visit Start Time: 1401  Visit Stop Time: 1457  Total Visit Duration:  56 minutes

## 2024-09-26 ENCOUNTER — SOCIAL WORK (OUTPATIENT)
Dept: BEHAVIORAL/MENTAL HEALTH CLINIC | Facility: CLINIC | Age: 34
End: 2024-09-26
Payer: COMMERCIAL

## 2024-09-26 DIAGNOSIS — F43.21 ADJUSTMENT DISORDER WITH DEPRESSED MOOD: ICD-10-CM

## 2024-09-26 DIAGNOSIS — F90.2 ATTENTION DEFICIT HYPERACTIVITY DISORDER, COMBINED TYPE: ICD-10-CM

## 2024-09-26 DIAGNOSIS — F41.1 GENERALIZED ANXIETY DISORDER: Primary | ICD-10-CM

## 2024-09-26 PROCEDURE — 90837 PSYTX W PT 60 MINUTES: CPT

## 2024-10-01 ENCOUNTER — DOCUMENTATION (OUTPATIENT)
Dept: BEHAVIORAL/MENTAL HEALTH CLINIC | Facility: CLINIC | Age: 34
End: 2024-10-01

## 2024-10-01 NOTE — PROGRESS NOTES
//TRANSFER SUMMARY    [facility]    Patient Name Maria Esther Moore     Date of Birth: 34 y.o. 1990      MRN: [id number]    Admission Date:  9/28/23    Date of Transfer: November 1, 2024    Admission Diagnosis:     Adjustment Disorder with depressed mood  Generalized Anxiety Disorder  ADHD, Combined type    Current Diagnosis:     No diagnosis found.    Reason for Admission: Maria Esther presented for treatment due to depression and anxiety. Primary complaints included ANXIETY SYMPTOMS: daily anxiety symptoms, worrying about everyday issues, worrying daily, poor concentration, anxiety attacks. Client is court ordered by family court due to custody proceedings between client and father of clients children    Progress in Treatment: Maria Esther was seen for Individual Couseling. During the course of treatment she has been extremely engaged and willing to do the work.  She has utilized various coping skills for anxiety and depression and has made tremendous progress.    Episodes of Higher Level of Care: No    Transfer request Initiated by: Psychiatrist:  Therapist:  Dennis Cross    Reason for Transfer Request: clinician leaving practice    Does this individual need a clinician with specialized training/expertise?: No    Is this client working with any other [facility] Providers/Therapists? Psychiatrist:  Therapist:     Other pertinent issues: None    Are there any specific individuals who would be a “best fit” or who have already agreed to accept this transfer request?  Female preferred    Psychiatrist:    Therapist:   Rationale:     Attempts to maintain the current therapeutic relationship: Not Applicable    Transfer request routed to Clinical Supervisor for input and/or approval.         Dennis Cross10/01/24
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
## 2024-10-10 ENCOUNTER — SOCIAL WORK (OUTPATIENT)
Dept: BEHAVIORAL/MENTAL HEALTH CLINIC | Facility: CLINIC | Age: 34
End: 2024-10-10
Payer: COMMERCIAL

## 2024-10-10 DIAGNOSIS — F90.2 ATTENTION DEFICIT HYPERACTIVITY DISORDER, COMBINED TYPE: ICD-10-CM

## 2024-10-10 DIAGNOSIS — F43.21 ADJUSTMENT DISORDER WITH DEPRESSED MOOD: ICD-10-CM

## 2024-10-10 DIAGNOSIS — F41.1 GENERALIZED ANXIETY DISORDER: Primary | ICD-10-CM

## 2024-10-10 PROCEDURE — 90834 PSYTX W PT 45 MINUTES: CPT

## 2024-10-10 NOTE — PSYCH
"Behavioral Health Psychotherapy Progress Note    Psychotherapy Provided: Individual Psychotherapy     No diagnosis found.    Goals addressed in session: Goal 1 and Goal 2     DATA:   Met with client individually.  Client reported feeling concerned due to recent lack of motivation when she doesn't have custody of her sons.  Client has not had full custody for 18 months, so therapist explored why she is feeling this lack of motivation now.  Client stated that she has been in \"flight or flight mode\" for 18 months, even when she didn't have the boys and not she doesn't have to prepare for court so this is her \"new normal\".  Open questions and effective listening were used to explore why this time is uncomfortable for her and what she could do to provider some additional structure for herself.  Therapist notes that this relates to her TP goal of defining who she is.  Client expressed concerns  that her sons will see her negatively due to the recent events.  CBT was used to challenge those thoughts and focous on what she can control.  Client stated that she can control the messages she gives to her sons about always being there for them and loving them unconditionally.  Client discussed having mixed feelings for Anthony's new wife due to her \"momming my children\" but also being grateful that she is there for them when needed.  Client stated wanting to focus on increasing her activities within the [employer] as well as with family and friends.   During this session, this clinician used the following therapeutic modalities: Client-centered Therapy, Cognitive Behavioral Therapy, and Supportive Psychotherapy    Substance Abuse was not addressed during this session. If the client is diagnosed with a co-occurring substance use disorder, please indicate any changes in the frequency or amount of use: . Stage of change for addressing substance use diagnoses: No substance use/Not applicable    ASSESSMENT:  Maria Esther Moore presents with " "a Euthymic/ normal mood.     her affect is Normal range and intensity, which is congruent, with her mood and the content of the session. The client has made progress on their goals. Maria Esther has been able to effectively process her feelings related to Anthony and the custody fight as well as to identify her strengths and ways to increase her idea of who she is     Maria Esther Moore presents with a minimal risk of suicide, minimal risk of self-harm, and minimal risk of harm to others.    For any risk assessment that surpasses a \"low\" rating, a safety plan must be developed.    A safety plan was indicated: no  If yes, describe in detail crisis plan on file    PLAN: Between sessions, Maria Esther Moore will continue to engage in activities to increase her social interactions as well as allowing herself to embrace having son=me down time to herself. At the next session, the therapist will use Client-centered Therapy, Cognitive Behavioral Therapy, and Supportive Psychotherapy to address anxiety and sense of self.    Behavioral Health Treatment Plan and Discharge Planning: Maria Esther Moore is aware of and agrees to continue to work on their treatment plan. They have identified and are working toward their discharge goals. yes    Visit start and stop times:    10/10/24  1401  2493  52 minutes     "

## 2024-11-07 ENCOUNTER — OFFICE VISIT (OUTPATIENT)
Dept: BEHAVIORAL/MENTAL HEALTH CLINIC | Facility: CLINIC | Age: 34
End: 2024-11-07
Payer: COMMERCIAL

## 2024-11-07 DIAGNOSIS — F41.1 GENERALIZED ANXIETY DISORDER: Primary | ICD-10-CM

## 2024-11-07 PROCEDURE — 90837 PSYTX W PT 60 MINUTES: CPT | Performed by: COUNSELOR

## 2024-11-07 NOTE — BH TREATMENT PLAN
Outpatient Behavioral Health Psychotherapy Treatment Plan    Maria Esther Moore  1990     Date of Initial Psychotherapy Assessment: 9/28/23   Date of Current Treatment Plan: 11/07/24  Treatment Plan Target Date: TBD  Treatment Plan Expiration Date: 5/7/25    Diagnosis:   1. 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     Stage of Change: Preparation    Target Date for completion: TBD     Anticipated therapeutic modalities: CBT, Family Systems, Mindfulness, ACT     People identified to complete this goal: Maria Esther Moore      Objective 1: (identify the means of measuring success in meeting the objective): In 6 months I will have learn about what types of activities I find interesting and want to pursue.       Objective 2: (identify the means of measuring success in meeting the objective): In 6 months I will learn how to hold myself accountable to do the things I say I'm going to do.           Objective 3: (identify the means of measuring success in meeting the objective): In 6 months I will make some new quality friends.         I am currently under the care of a [facility] psychiatric provider: no    My [facility] psychiatric provider is: N/A    I am currently taking psychiatric medications: Yes, as prescribed    I feel that I will be ready for discharge from mental health care when I reach the following (measurable goal/objective): Once I can I see that I am following through with my goals.     For children and adults who have a legal guardian:   Has there been any change to custody orders and/or guardianship status? No. If yes, attach updated documentation.    I have created my Crisis Plan and have been offered a copy of this plan    Behavioral Health Treatment Plan  ke:  Diagnosis and Treatment Plan explained to Maria Esther Moore acknowledges an understanding of their diagnosis. Maria Esther Moore agrees to this treatment plan.    I have been offered a copy of this Treatment Plan. yes

## 2024-11-07 NOTE — PSYCH
"Behavioral Health Psychotherapy Progress Note    Psychotherapy Provided: Individual Psychotherapy     1. Generalized anxiety disorder            Goals addressed in session: Goal 1     DATA: Client and therapist met for an in person session. Client and therapist worked on building rapport and creating a treatment and crisis plan.  Client provided background information on her life and challenges with custody, panic attacks, motivation and isolation.  Client endorses good insight into how to challenge thoughts, talk herself down from panic attacks and motivating herself to use her healthy coping skills. Therapist used open ended questions, reflection and validation around ideas connected to clients story, anxiety triggers and beliefs about self.   During this session, this clinician used the following therapeutic modalities: Cognitive Behavioral Therapy    Substance Abuse was not addressed during this session. If the client is diagnosed with a co-occurring substance use disorder, please indicate any changes in the frequency or amount of use: NA. Stage of change for addressing substance use diagnoses: No substance use/Not applicable    ASSESSMENT:  Maria Esther Moore presents with a Euthymic/ normal mood.     her affect is Normal range and intensity, which is congruent, with her mood and the content of the session. The client has made progress on their goals.    Client is working on developing insight into her instincts and building on her self awareness.  Maria Esther Moore presents with a none risk of suicide, none risk of self-harm, and none risk of harm to others.    For any risk assessment that surpasses a \"low\" rating, a safety plan must be developed.    A safety plan was indicated: no  If yes, describe in detail N/A    PLAN: Between sessions, Maria Esther Moore will continue to challenge negative thoughts. At the next session, the therapist will use Cognitive Behavioral Therapy, Mindfulness-based Strategies, and " Family Systems  to address anxiety.    Behavioral Health Treatment Plan and Discharge Planning: Maria Esther Moore is aware of and agrees to continue to work on their treatment plan. They have identified and are working toward their discharge goals. yes    Visit start and stop times:    11/07/24  Start Time: 1300  Stop Time: 1353  Total Visit Time: 53 minutes

## 2024-11-07 NOTE — BH CRISIS PLAN
Client Name: Maria Esther Moore       Client YOB: 1990    Milly Safety Plan      Creation Date: 11/7/24 Update Date: 11/7/25   Created By: Bindu Workman Last Updated By: Bindu Workman      Step 1: Warning Signs:   Warning Signs   panic attacks   cravings to go have a drink   obvious distress on my face   can't sit sill   hot   hands get clammy hot            Step 2: Internal Coping Strategies:   Internal Coping Strategies   breathwork   running   reading   dog walking   challenge thoughts   paddleboarding   hiking            Step 3: People and social settings that provide distraction:   Name Contact Information   Landy (friend) cell number stored in phone   Marisol (friend) Cell number stored in phone    Places   nature   walking trails           Step 4: People whom I can ask for help during a crisis:      Name Contact Information    Friends number stored in phone      Step 5: Professionals or agencies I can contact during a crisis:      Clinican/Agency Name Phone Emergency Contact    [facility] Crisis [phone]       [facility] Emergency Department Emergency Department Phone Emergency Department Address    [facility] [phone] [address] [place] [address] [address] [place]        Crisis Phone Numbers:   Suicide Prevention Lifeline: Call or Text  797 Crisis Text Line: Text HOME to 089-857   Please note: Some [facility] do not have a separate number for Child/Adolescent specific crisis. If your county is not listed under Child/Adolescent, please call the adult number for your county      Adult Crisis Numbers: Child/Adolescent Crisis Numbers   [facility]: [phone] [facility]: [phone]   [facility]: [phone] [facility]: [phone]   [facility]: [phone] [place]: [phone]   [facility]: [phone] [place]: [phone]   [place] [facility]: [phone]   [facility]: [phone]   [facility]: [phone]    [facility]: [phone] (daytime) [phone] (after hours, weekends, holidays)      Step 6: Making the environment safer (plan for lethal means safety):   Patient did not identify any lethal methods: Yes     Optional: What is most important to me and worth living for?   My kids and my dog.      Milly Safety Plan. Juanis Steve and Ha Ramirez. Used with permission of the authors.

## 2024-11-20 ENCOUNTER — OFFICE VISIT (OUTPATIENT)
Dept: BEHAVIORAL/MENTAL HEALTH CLINIC | Facility: CLINIC | Age: 34
End: 2024-11-20
Payer: COMMERCIAL

## 2024-11-20 DIAGNOSIS — F41.1 GENERALIZED ANXIETY DISORDER: Primary | ICD-10-CM

## 2024-11-20 PROCEDURE — 90837 PSYTX W PT 60 MINUTES: CPT | Performed by: COUNSELOR
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
## 2024-12-02 ENCOUNTER — OFFICE VISIT (OUTPATIENT)
Dept: BEHAVIORAL/MENTAL HEALTH CLINIC | Facility: CLINIC | Age: 34
End: 2024-12-02
Payer: COMMERCIAL

## 2024-12-02 DIAGNOSIS — F41.1 GENERALIZED ANXIETY DISORDER: Primary | ICD-10-CM

## 2024-12-02 PROCEDURE — 90837 PSYTX W PT 60 MINUTES: CPT | Performed by: COUNSELOR
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
## 2024-12-16 ENCOUNTER — TELEMEDICINE (OUTPATIENT)
Dept: BEHAVIORAL/MENTAL HEALTH CLINIC | Facility: CLINIC | Age: 34
End: 2024-12-16
Payer: COMMERCIAL

## 2024-12-16 DIAGNOSIS — F41.1 GENERALIZED ANXIETY DISORDER: Primary | ICD-10-CM

## 2024-12-16 PROCEDURE — 90837 PSYTX W PT 60 MINUTES: CPT | Performed by: COUNSELOR
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
## 2025-01-08 ENCOUNTER — OFFICE VISIT (OUTPATIENT)
Dept: BEHAVIORAL/MENTAL HEALTH CLINIC | Facility: CLINIC | Age: 35
End: 2025-01-08
Payer: COMMERCIAL

## 2025-01-08 DIAGNOSIS — F41.1 GENERALIZED ANXIETY DISORDER: Primary | ICD-10-CM

## 2025-01-08 PROCEDURE — 90837 PSYTX W PT 60 MINUTES: CPT | Performed by: COUNSELOR
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
## 2025-01-22 ENCOUNTER — TELEMEDICINE (OUTPATIENT)
Dept: BEHAVIORAL/MENTAL HEALTH CLINIC | Facility: CLINIC | Age: 35
End: 2025-01-22
Payer: COMMERCIAL

## 2025-01-22 DIAGNOSIS — F41.1 GENERALIZED ANXIETY DISORDER: Primary | ICD-10-CM

## 2025-01-22 PROCEDURE — 90837 PSYTX W PT 60 MINUTES: CPT | Performed by: COUNSELOR
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
## 2025-02-18 ENCOUNTER — TELEPHONE (OUTPATIENT)
Dept: PSYCHIATRY | Facility: CLINIC | Age: 35
End: 2025-02-18

## 2025-02-18 NOTE — TELEPHONE ENCOUNTER
Writer called and left voicemail for client informing her that Bindu Workman will be out tomorrow, 2/19/25, due to being sick.

## 2025-03-05 ENCOUNTER — TELEMEDICINE (OUTPATIENT)
Dept: BEHAVIORAL/MENTAL HEALTH CLINIC | Facility: CLINIC | Age: 35
End: 2025-03-05
Payer: COMMERCIAL

## 2025-03-05 DIAGNOSIS — F41.1 GENERALIZED ANXIETY DISORDER: Primary | ICD-10-CM

## 2025-03-05 PROCEDURE — 90837 PSYTX W PT 60 MINUTES: CPT | Performed by: COUNSELOR
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
## 2025-04-02 ENCOUNTER — TELEMEDICINE (OUTPATIENT)
Dept: BEHAVIORAL/MENTAL HEALTH CLINIC | Facility: CLINIC | Age: 35
End: 2025-04-02
Payer: COMMERCIAL

## 2025-04-02 DIAGNOSIS — F41.1 GENERALIZED ANXIETY DISORDER: Primary | ICD-10-CM

## 2025-04-02 PROCEDURE — 90837 PSYTX W PT 60 MINUTES: CPT | Performed by: COUNSELOR
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
## 2025-04-16 ENCOUNTER — TELEMEDICINE (OUTPATIENT)
Dept: BEHAVIORAL/MENTAL HEALTH CLINIC | Facility: CLINIC | Age: 35
End: 2025-04-16
Payer: COMMERCIAL

## 2025-04-16 DIAGNOSIS — F41.1 GENERALIZED ANXIETY DISORDER: Primary | ICD-10-CM

## 2025-04-16 PROCEDURE — 90837 PSYTX W PT 60 MINUTES: CPT | Performed by: COUNSELOR
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
## 2025-04-30 ENCOUNTER — TELEMEDICINE (OUTPATIENT)
Dept: BEHAVIORAL/MENTAL HEALTH CLINIC | Facility: CLINIC | Age: 35
End: 2025-04-30
Payer: COMMERCIAL

## 2025-04-30 DIAGNOSIS — F41.1 GENERALIZED ANXIETY DISORDER: Primary | ICD-10-CM

## 2025-04-30 PROCEDURE — 90834 PSYTX W PT 45 MINUTES: CPT | Performed by: COUNSELOR

## 2025-04-30 NOTE — BH TREATMENT PLAN
Outpatient Behavioral Health Psychotherapy Treatment Plan    Maria Esther Moore  1990     Date of Initial Psychotherapy Assessment: 9/28/23   Date of Current Treatment Plan: 04/30/25  Treatment Plan Target Date: TBD  Treatment Plan Expiration Date: 10/30/25    Diagnosis:   1. 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     Stage of Change: Preparation    Target Date for completion: TBD     Anticipated therapeutic modalities: CBT, Family Systems, Mindfulness, ACT     People identified to complete this goal: Maria Esther Moore      Objective 1: (identify the means of measuring success in meeting the objective): In 6 months I will have learn about what types of activities I find interesting and want to pursue.  UPDATE: Client has engaged in several fun activities like canoeing, reading, painting.       Objective 2: (identify the means of measuring success in meeting the objective): In 6 months I will learn how to hold myself accountable to do the things I say I'm going to do.  UPDATE 4/30/25 Client is finding this easier to do.            Objective 3: (identify the means of measuring success in meeting the objective): In 6 months I will make some new quality friends.  UPDATE: Client is making attempts and being protective of self.         I am currently under the care of a [facility] psychiatric provider: no    My [facility] psychiatric provider is: N/A    I am currently taking psychiatric medications: Yes, as prescribed    I feel that I will be ready for discharge from mental health care when I reach the following (measurable goal/objective): Once I can I see that I am following through with my goals.     For children and adults who have a legal guardian:   Has there been any change to  custody orders and/or guardianship status? No. If yes, attach updated documentation.    I have created my Crisis Plan and have been offered a copy of this plan    Behavioral Health Treatment Plan [facility]: Diagnosis and Treatment Plan explained to Maria Esther Moore acknowledges an understanding of their diagnosis. Maria Esther Moore agrees to this treatment plan.    I have been offered a copy of this Treatment Plan. yes

## 2025-04-30 NOTE — PSYCH
"Virtual Regular VisitName: Maria Esther Moore      : 1990      MRN: [id number]  Encounter Provider: Bindu Workman  Encounter Date: 2025   Encounter department: [facility] THERAPIST MENTAL HEALTH OUTPATIENT  :  Assessment & Plan  Generalized anxiety disorder             Goals addressed in session: Goal 1     DATA: Client processed on progress made and endorses less anxiety. Client was able to catch herself before descending into a panic attack by observing her body and using breathing. Client endorses better co parenting relationship. Client processed on a meeting with a potential work source that became toxic and how she navigated this. Client examined relationship with roommate and how much money she saved by speaking up about her needs.  Client examines and updated her goals and is able to see progress and feels should decrease sessions to once a month.   During this session, this clinician used the following therapeutic modalities: Cognitive Behavioral Therapy    Substance Abuse was not addressed during this session. If the client is diagnosed with a co-occurring substance use disorder, please indicate any changes in the frequency or amount of use: NA. Stage of change for addressing substance use diagnoses: No substance use/Not applicable    ASSESSMENT:  Maria Esther presents with a Euthymic/ normal mood. Negins affect is Normal range and intensity, which is congruent, with their mood and the content of the session. The client has made progress on their goals as evidenced by.    Maria Esther presents with a none risk of suicide, none risk of self-harm, and none risk of harm to others.    For any risk assessment that surpasses a \"low\" rating, a safety plan must be developed.    A safety plan was indicated: no  If yes, describe in detail NA    PLAN: Between sessions, Maria Esther will continue using skills. At the next session, the therapist will use Cognitive Behavioral Therapy to address " anxiety.    Behavioral Health Treatment Plan [facility]: Diagnosis and Treatment Plan explained to Maria Esther, Maria Esther relates understanding diagnosis and is agreeable to Treatment Plan. Yes     Depression Follow-up Plan Completed: No     Reason for visit is   Chief Complaint   Patient presents with    Virtual Regular Visit      Recent Visits  No visits were found meeting these conditions.  Showing recent visits within past 7 days and meeting all other requirements  Today's Visits  Date Type Provider Dept   04/30/25 Telemedicine Bindu Workman [facility] Therapist [facility]   Showing today's visits and meeting all other requirements  Future Appointments  No visits were found meeting these conditions.  Showing future appointments within next 150 days and meeting all other requirements     History of Present Illness     HPI    Past Medical History   No past medical history on file.  No past surgical history on file.  No current outpatient medications  Not on File    Objective   There were no vitals taken for this visit.    Video Exam  Physical Exam     Administrative Statements   Encounter provider Bindu Workman    The Patient is located at Home and in the following state in which I hold an active license [place].    The patient was identified by name and date of birth. Maria Esther Moore was informed that this is a telemedicine visit and that the visit is being conducted through the Epic Embedded platform. She agrees to proceed..  My office door was closed. No one else was in the room.  She acknowledged consent and understanding of privacy and security of the video platform. The patient has agreed to participate and understands they can discontinue the visit at any time.    I have spent a total time of 48 minutes in caring for this patient on the day of the visit/encounter including Counseling / Coordination of care, not including the time spent for establishing the audio/video connection.    Visit Time  Start Time: 0900  Stop Time:  0978  Total Visit Time: 48 minutes

## 2025-05-28 ENCOUNTER — TELEMEDICINE (OUTPATIENT)
Dept: BEHAVIORAL/MENTAL HEALTH CLINIC | Facility: CLINIC | Age: 35
End: 2025-05-28
Payer: COMMERCIAL

## 2025-05-28 DIAGNOSIS — F41.1 GENERALIZED ANXIETY DISORDER: Primary | ICD-10-CM

## 2025-05-28 PROCEDURE — 90834 PSYTX W PT 45 MINUTES: CPT | Performed by: COUNSELOR

## 2025-05-28 NOTE — PSYCH
"Virtual Regular VisitName: Maria Esther Moore      : 1990      MRN: [id number]  Encounter Provider: Bindu Workman  Encounter Date: 2025   Encounter department: [facility] THERAPIST MENTAL HEALTH OUTPATIENT  :  Assessment & Plan  Generalized anxiety disorder             Goals addressed in session: Goal 1     DATA: Client endorses decreased anxiety and stress and relates this to her use of coping skills like grounding, breathing and challenging negative thoughts. Client examined how this has improved her relationship with ex and children. Client is now able to drive long distances without assistance or anxiety panic attacks.  Client is enjoying her independence and is able to go visit family without fear.  Client is now educating herself around somatics and how the CNS works.  Client is helping children learn emotional regulation skills. Client has reconnected with old friends and enjoying hanging out with their children together.       During this session, this clinician used the following therapeutic modalities: Cognitive Behavioral Therapy and Mindfulness-based Strategies    Substance Abuse was not addressed during this session. If the client is diagnosed with a co-occurring substance use disorder, please indicate any changes in the frequency or amount of use: NA. Stage of change for addressing substance use diagnoses: No substance use/Not applicable    ASSESSMENT:  Maria Esther presents with a Euthymic/ normal mood. Maria Esther's affect is Normal range and intensity, which is congruent, with their mood and the content of the session. The client has made progress on their goals as evidenced by Client is reporting reduced anxiety and panic attacks and is using skills to cope.    Maria Esther presents with a none risk of suicide, none risk of self-harm, and none risk of harm to others.    For any risk assessment that surpasses a \"low\" rating, a safety plan must be developed.    A safety plan was " indicated: no  If yes, describe in detail NA    PLAN: Between sessions, Maria Esther will continue using skills. At the next session, the therapist will use Cognitive Behavioral Therapy and Mindfulness-based Strategies to address anxiety.    Behavioral Health Treatment Plan  [facility]: Diagnosis and Treatment Plan explained to Maria Esther, Maria Esther relates understanding diagnosis and is agreeable to Treatment Plan. Yes     Depression Follow-up Plan Completed: Not applicable     Reason for visit is No chief complaint on file.     Recent Visits  No visits were found meeting these conditions.  Showing recent visits within past 7 days and meeting all other requirements  Today's Visits  Date Type Provider Dept   05/28/25 Telemedicine Bindu oWrkman [facility] Therapist [facility]   Showing today's visits and meeting all other requirements  Future Appointments  No visits were found meeting these conditions.  Showing future appointments within next 150 days and meeting all other requirements     History of Present Illness     HPI    Past Medical History   Past Medical History[1]  Past Surgical History[2]  No current outpatient medications  Allergies[3]    Objective   There were no vitals taken for this visit.    Video Exam  Physical Exam     Administrative Statements   Encounter provider Bindu Workman    The Patient is located at Home and in the following state in which I hold an active license [place].    The patient was identified by name and date of birth. Maria Esther Moore was informed that this is a telemedicine visit and that the visit is being conducted through the Epic Embedded platform. She agrees to proceed..  My office door was closed. No one else was in the room.  She acknowledged consent and understanding of privacy and security of the video platform. The patient has agreed to participate and understands they can discontinue the visit at any time.    I have spent a total time of 38 minutes in caring for this patient on the day of  the visit/encounter including Counseling / Coordination of care, not including the time spent for establishing the audio/video connection.    Visit Time  Start Time: 0910  Stop Time: 0948  Total Visit Time: 38 minutes       [1] No past medical history on file.  [2] No past surgical history on file.  [3] Not on File

## 2025-06-20 ENCOUNTER — TELEPHONE (OUTPATIENT)
Dept: PSYCHIATRY | Facility: CLINIC | Age: 35
End: 2025-06-20

## 2025-06-20 ENCOUNTER — DOCUMENTATION (OUTPATIENT)
Dept: BEHAVIORAL/MENTAL HEALTH CLINIC | Facility: CLINIC | Age: 35
End: 2025-06-20

## 2025-06-20 DIAGNOSIS — F41.1 GENERALIZED ANXIETY DISORDER: Primary | ICD-10-CM
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
